# Patient Record
Sex: FEMALE | Race: BLACK OR AFRICAN AMERICAN | NOT HISPANIC OR LATINO | URBAN - METROPOLITAN AREA
[De-identification: names, ages, dates, MRNs, and addresses within clinical notes are randomized per-mention and may not be internally consistent; named-entity substitution may affect disease eponyms.]

---

## 2022-01-01 ENCOUNTER — INPATIENT (INPATIENT)
Facility: HOSPITAL | Age: 37
LOS: 0 days | End: 2022-08-11
Attending: INTERNAL MEDICINE | Admitting: INTERNAL MEDICINE

## 2022-01-01 VITALS — WEIGHT: 179.9 LBS

## 2022-01-01 DIAGNOSIS — E87.2 ACIDOSIS: ICD-10-CM

## 2022-01-01 LAB
ABO RH CONFIRMATION: SIGNIFICANT CHANGE UP
ALBUMIN SERPL ELPH-MCNC: 2.5 G/DL — LOW (ref 3.5–5.2)
ALP SERPL-CCNC: 74 U/L — SIGNIFICANT CHANGE UP (ref 30–115)
ALT FLD-CCNC: 34 U/L — SIGNIFICANT CHANGE UP (ref 0–41)
ANION GAP SERPL CALC-SCNC: 30 MMOL/L — HIGH (ref 7–14)
ANISOCYTOSIS BLD QL: SIGNIFICANT CHANGE UP
APAP SERPL-MCNC: <5 UG/ML — LOW (ref 10–30)
AST SERPL-CCNC: 106 U/L — HIGH (ref 0–41)
BASE EXCESS BLDV CALC-SCNC: -18.8 MMOL/L — LOW (ref -2–3)
BASOPHILS # BLD AUTO: 0.01 K/UL — SIGNIFICANT CHANGE UP (ref 0–0.2)
BASOPHILS NFR BLD AUTO: 1 % — SIGNIFICANT CHANGE UP (ref 0–1)
BILIRUB SERPL-MCNC: 0.4 MG/DL — SIGNIFICANT CHANGE UP (ref 0.2–1.2)
BLD GP AB SCN SERPL QL: SIGNIFICANT CHANGE UP
BUN SERPL-MCNC: 70 MG/DL — CRITICAL HIGH (ref 10–20)
BURR CELLS BLD QL SMEAR: PRESENT — SIGNIFICANT CHANGE UP
CA-I SERPL-SCNC: 1.38 MMOL/L — HIGH (ref 1.15–1.33)
CA-I SERPL-SCNC: 1.96 MMOL/L — CRITICAL HIGH (ref 1.15–1.33)
CALCIUM SERPL-MCNC: 12.6 MG/DL — HIGH (ref 8.5–10.1)
CHLORIDE SERPL-SCNC: 100 MMOL/L — SIGNIFICANT CHANGE UP (ref 98–110)
CO2 BLDV-SCNC: 12.5 MMOL/L — LOW (ref 22–26)
CO2 SERPL-SCNC: 6 MMOL/L — CRITICAL LOW (ref 17–32)
CREAT SERPL-MCNC: 4.5 MG/DL — CRITICAL HIGH (ref 0.7–1.5)
EGFR: 12 ML/MIN/1.73M2 — LOW
EOSINOPHIL # BLD AUTO: 0.01 K/UL — SIGNIFICANT CHANGE UP (ref 0–0.7)
EOSINOPHIL NFR BLD AUTO: 1 % — SIGNIFICANT CHANGE UP (ref 0–8)
ETHANOL SERPL-MCNC: <10 MG/DL — SIGNIFICANT CHANGE UP
GAS PNL BLDV: 128 MMOL/L — LOW (ref 136–145)
GAS PNL BLDV: 133 MMOL/L — LOW (ref 136–145)
GAS PNL BLDV: SIGNIFICANT CHANGE UP
GIANT PLATELETS BLD QL SMEAR: PRESENT — SIGNIFICANT CHANGE UP
GLUCOSE BLDC GLUCOMTR-MCNC: 131 MG/DL — HIGH (ref 70–99)
GLUCOSE SERPL-MCNC: 189 MG/DL — HIGH (ref 70–99)
GRAM STN FLD: SIGNIFICANT CHANGE UP
GRAM STN FLD: SIGNIFICANT CHANGE UP
HCG SERPL QL: NEGATIVE — SIGNIFICANT CHANGE UP
HCO3 BLDV-SCNC: 11 MMOL/L — LOW (ref 22–29)
HCT VFR BLD CALC: 23.5 % — LOW (ref 37–47)
HCT VFR BLDA CALC: 16 % — CRITICAL LOW (ref 39–51)
HCT VFR BLDA CALC: 26 % — LOW (ref 39–51)
HGB BLD CALC-MCNC: 5.2 G/DL — CRITICAL LOW (ref 12.6–17.4)
HGB BLD CALC-MCNC: 8.5 G/DL — LOW (ref 12.6–17.4)
HGB BLD-MCNC: 7 G/DL — LOW (ref 12–16)
HOROWITZ INDEX BLDV+IHG-RTO: 100 — SIGNIFICANT CHANGE UP
LACTATE BLDV-MCNC: 11.3 MMOL/L — CRITICAL HIGH (ref 0.5–2)
LACTATE BLDV-MCNC: 15 MMOL/L — CRITICAL HIGH (ref 0.5–2)
LYMPHOCYTES # BLD AUTO: 0.86 K/UL — LOW (ref 1.2–3.4)
LYMPHOCYTES # BLD AUTO: 96.1 % — HIGH (ref 20.5–51.1)
MACROCYTES BLD QL: SLIGHT — SIGNIFICANT CHANGE UP
MAGNESIUM SERPL-MCNC: 2.6 MG/DL — HIGH (ref 1.8–2.4)
MANUAL SMEAR VERIFICATION: SIGNIFICANT CHANGE UP
MCHC RBC-ENTMCNC: 29.8 G/DL — LOW (ref 32–37)
MCHC RBC-ENTMCNC: 31.4 PG — HIGH (ref 27–31)
MCV RBC AUTO: 105.4 FL — HIGH (ref 81–99)
MONOCYTES # BLD AUTO: 0.02 K/UL — LOW (ref 0.1–0.6)
MONOCYTES NFR BLD AUTO: 1.9 % — SIGNIFICANT CHANGE UP (ref 1.7–9.3)
NEUTROPHILS # BLD AUTO: 0 K/UL — LOW (ref 1.4–6.5)
NEUTROPHILS NFR BLD AUTO: 0 % — LOW (ref 42.2–75.2)
NRBC # BLD: 16 /100 — HIGH (ref 0–0)
NRBC # BLD: SIGNIFICANT CHANGE UP /100 WBCS (ref 0–0)
NT-PROBNP SERPL-SCNC: HIGH PG/ML (ref 0–300)
OVALOCYTES BLD QL SMEAR: SIGNIFICANT CHANGE UP
PCO2 BLDV: 48 MMHG — HIGH (ref 39–42)
PCO2 BLDV: 66 MMHG — HIGH (ref 39–42)
PH BLDV: 6.8 — LOW (ref 7.32–7.43)
PH BLDV: 6.97 — LOW (ref 7.32–7.43)
PLAT MORPH BLD: ABNORMAL
PLATELET # BLD AUTO: 61 K/UL — LOW (ref 130–400)
PO2 BLDV: 50 MMHG — SIGNIFICANT CHANGE UP
PO2 BLDV: 6 MMHG — SIGNIFICANT CHANGE UP
POIKILOCYTOSIS BLD QL AUTO: SIGNIFICANT CHANGE UP
POLYCHROMASIA BLD QL SMEAR: SLIGHT — SIGNIFICANT CHANGE UP
POTASSIUM BLDV-SCNC: 5.8 MMOL/L — HIGH (ref 3.5–5.1)
POTASSIUM BLDV-SCNC: 6.3 MMOL/L — CRITICAL HIGH (ref 3.5–5.1)
POTASSIUM SERPL-MCNC: 6.1 MMOL/L — CRITICAL HIGH (ref 3.5–5)
POTASSIUM SERPL-SCNC: 6.1 MMOL/L — CRITICAL HIGH (ref 3.5–5)
PROT SERPL-MCNC: 4.8 G/DL — LOW (ref 6–8)
RAPID RVP RESULT: DETECTED
RBC # BLD: 2.23 M/UL — LOW (ref 4.2–5.4)
RBC # FLD: 22.9 % — HIGH (ref 11.5–14.5)
RBC BLD AUTO: ABNORMAL
SALICYLATES SERPL-MCNC: 0.8 MG/DL — LOW (ref 4–30)
SAO2 % BLDV: 34.4 % — SIGNIFICANT CHANGE UP
SAO2 % BLDV: 49.8 % — SIGNIFICANT CHANGE UP
SARS-COV-2 RNA SPEC QL NAA+PROBE: DETECTED
SMUDGE CELLS # BLD: PRESENT — SIGNIFICANT CHANGE UP
SODIUM SERPL-SCNC: 136 MMOL/L — SIGNIFICANT CHANGE UP (ref 135–146)
SPECIMEN SOURCE: SIGNIFICANT CHANGE UP
SPECIMEN SOURCE: SIGNIFICANT CHANGE UP
TROPONIN T SERPL-MCNC: 0.03 NG/ML — CRITICAL HIGH
WBC # BLD: 0.9 K/UL — CRITICAL LOW (ref 4.8–10.8)
WBC # FLD AUTO: 0.9 K/UL — CRITICAL LOW (ref 4.8–10.8)

## 2022-01-01 PROCEDURE — 71045 X-RAY EXAM CHEST 1 VIEW: CPT | Mod: 26

## 2022-01-01 PROCEDURE — 93010 ELECTROCARDIOGRAM REPORT: CPT

## 2022-01-01 PROCEDURE — 71275 CT ANGIOGRAPHY CHEST: CPT | Mod: 26,MA

## 2022-01-01 PROCEDURE — 99053 MED SERV 10PM-8AM 24 HR FAC: CPT

## 2022-01-01 PROCEDURE — 72125 CT NECK SPINE W/O DYE: CPT | Mod: 26,MA

## 2022-01-01 PROCEDURE — 99291 CRITICAL CARE FIRST HOUR: CPT | Mod: 25

## 2022-01-01 PROCEDURE — 92950 HEART/LUNG RESUSCITATION CPR: CPT

## 2022-01-01 PROCEDURE — 31500 INSERT EMERGENCY AIRWAY: CPT

## 2022-01-01 PROCEDURE — 74177 CT ABD & PELVIS W/CONTRAST: CPT | Mod: 26,MA

## 2022-01-01 PROCEDURE — 36556 INSERT NON-TUNNEL CV CATH: CPT

## 2022-01-01 PROCEDURE — 70450 CT HEAD/BRAIN W/O DYE: CPT | Mod: 26,MA

## 2022-01-01 PROCEDURE — 76937 US GUIDE VASCULAR ACCESS: CPT | Mod: 26

## 2022-01-01 RX ORDER — FENTANYL CITRATE 50 UG/ML
0.5 INJECTION INTRAVENOUS
Qty: 5000 | Refills: 0 | Status: DISCONTINUED | OUTPATIENT
Start: 2022-01-01 | End: 2022-01-01

## 2022-01-01 RX ORDER — NOREPINEPHRINE BITARTRATE/D5W 8 MG/250ML
0.05 PLASTIC BAG, INJECTION (ML) INTRAVENOUS
Qty: 8 | Refills: 0 | Status: DISCONTINUED | OUTPATIENT
Start: 2022-01-01 | End: 2022-01-01

## 2022-01-01 RX ORDER — FENTANYL CITRATE 50 UG/ML
0.5 INJECTION INTRAVENOUS
Qty: 2500 | Refills: 0 | Status: DISCONTINUED | OUTPATIENT
Start: 2022-01-01 | End: 2022-01-01

## 2022-01-01 RX ORDER — VANCOMYCIN HCL 1 G
1000 VIAL (EA) INTRAVENOUS ONCE
Refills: 0 | Status: COMPLETED | OUTPATIENT
Start: 2022-01-01 | End: 2022-01-01

## 2022-01-01 RX ORDER — CEFEPIME 1 G/1
2000 INJECTION, POWDER, FOR SOLUTION INTRAMUSCULAR; INTRAVENOUS ONCE
Refills: 0 | Status: COMPLETED | OUTPATIENT
Start: 2022-01-01 | End: 2022-01-01

## 2022-01-01 RX ORDER — ROCURONIUM BROMIDE 10 MG/ML
100 VIAL (ML) INTRAVENOUS ONCE
Refills: 0 | Status: COMPLETED | OUTPATIENT
Start: 2022-01-01 | End: 2022-01-01

## 2022-01-01 RX ORDER — ETOMIDATE 2 MG/ML
20 INJECTION INTRAVENOUS ONCE
Refills: 0 | Status: COMPLETED | OUTPATIENT
Start: 2022-01-01 | End: 2022-01-01

## 2022-01-01 RX ORDER — CHLORHEXIDINE GLUCONATE 213 G/1000ML
1 SOLUTION TOPICAL
Refills: 0 | Status: DISCONTINUED | OUTPATIENT
Start: 2022-01-01 | End: 2022-01-01

## 2022-01-01 RX ORDER — NOREPINEPHRINE BITARTRATE/D5W 8 MG/250ML
0.05 PLASTIC BAG, INJECTION (ML) INTRAVENOUS
Qty: 32 | Refills: 0 | Status: DISCONTINUED | OUTPATIENT
Start: 2022-01-01 | End: 2022-01-01

## 2022-01-01 RX ORDER — SODIUM CHLORIDE 9 MG/ML
1000 INJECTION, SOLUTION INTRAVENOUS ONCE
Refills: 0 | Status: COMPLETED | OUTPATIENT
Start: 2022-01-01 | End: 2022-01-01

## 2022-01-01 RX ORDER — VASOPRESSIN 20 [USP'U]/ML
0.02 INJECTION INTRAVENOUS
Qty: 50 | Refills: 0 | Status: DISCONTINUED | OUTPATIENT
Start: 2022-01-01 | End: 2022-01-01

## 2022-01-01 RX ORDER — SODIUM CHLORIDE 9 MG/ML
1000 INJECTION INTRAMUSCULAR; INTRAVENOUS; SUBCUTANEOUS ONCE
Refills: 0 | Status: COMPLETED | OUTPATIENT
Start: 2022-01-01 | End: 2022-01-01

## 2022-01-01 RX ADMIN — SODIUM CHLORIDE 1000 MILLILITER(S): 9 INJECTION INTRAMUSCULAR; INTRAVENOUS; SUBCUTANEOUS at 02:17

## 2022-01-01 RX ADMIN — Medication 5.91 MICROGRAM(S)/KG/MIN: at 23:55

## 2022-01-01 RX ADMIN — Medication 100 MILLIGRAM(S): at 23:15

## 2022-01-01 RX ADMIN — FENTANYL CITRATE 3.15 MICROGRAM(S)/KG/HR: 50 INJECTION INTRAVENOUS at 02:17

## 2022-01-01 RX ADMIN — VASOPRESSIN 1.2 UNIT(S)/MIN: 20 INJECTION INTRAVENOUS at 02:17

## 2022-01-01 RX ADMIN — ETOMIDATE 20 MILLIGRAM(S): 2 INJECTION INTRAVENOUS at 23:15

## 2022-01-01 RX ADMIN — Medication 2.95 MICROGRAM(S)/KG/MIN: at 01:35

## 2022-01-01 RX ADMIN — SODIUM CHLORIDE 1000 MILLILITER(S): 9 INJECTION, SOLUTION INTRAVENOUS at 01:19

## 2022-01-01 RX ADMIN — CEFEPIME 100 MILLIGRAM(S): 1 INJECTION, POWDER, FOR SOLUTION INTRAMUSCULAR; INTRAVENOUS at 02:16

## 2022-01-01 RX ADMIN — Medication 250 MILLIGRAM(S): at 02:18

## 2022-08-10 NOTE — ED ADULT TRIAGE NOTE - CHIEF COMPLAINT QUOTE
prenotification for respiratory distress  upon arrival to ED, pt in cardiac arrest, chest compressions in progress

## 2022-08-11 NOTE — ED PROVIDER NOTE - CLINICAL SUMMARY MEDICAL DECISION MAKING FREE TEXT BOX
37-year-old female presented to ED in cardiac arrest.  Multiple rounds of epi and intubation bicarb and calcium ROSC was achieved.  Patient was found to be hypotensive and was started on Levophed drip.  Patient had central line placed and A-line placed.  Patient had labs which demonstrated lactate of 15, anemia with hgb of 7, creatinine of 4.5 Nuñez was placed without any urine, pH of 6.8 due to cardiac arrest. CT demonstrated multifocal pneumonia patient started on broad spectrum antibiotics.  Patient's white blood cell count 0.9.  Troponin elevated to 0.03 due to demand.  Patient's COVID test positive.  CT head demonstrated anoxic brain injury.  Discussed results with family and patient admitted to ICU for further evaluation and management.

## 2022-08-11 NOTE — ED PROVIDER NOTE - SECONDARY DIAGNOSIS.
Lung transplanted Sepsis with acute respiratory failure and septic shock 2019 novel coronavirus disease (COVID-19) Acute renal failure Brain anoxic injury

## 2022-08-11 NOTE — ED PROVIDER NOTE - ATTENDING CONTRIBUTION TO CARE
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  Patient arrived in the emergency department she was being bagged and active compressions.  According to mom patient had COVID 2 weeks ago received antibody treatment but over the past couple days she has been increasingly weak more short of breath as well as having diarrhea.  Tonight she told her mom that she did not feel well but she wanted to wait to the morning before she saw her doctor.    Const: active CPR in progress   Eyes: pupils non-reactive   GI: vomit coming out   MSK: No gross deformities appreciated    ACLS

## 2022-08-11 NOTE — DISCHARGE NOTE FOR THE EXPIRED PATIENT - HOSPITAL COURSE
37 F with PMH of ILD s/p L lung transplant BIBEMS; was hypoxic at home to 80s on NRB, had cardiac arrest en route to the hospital. She had tested positive for COVID recently and was increasingly short of breath leading up to today. CPR started by EMS and continued in ED for a total of 20 minutes, intubated. She later had cardiac arrest in ED, ROSC achieved after 10 minutes (asystole/PEA, epinephrine and bicarb given), cardiac arrest again, ROSC after 6 minutes (PEA/asystole). After this third cardiac arrest, the family decided to make her DNR. She was pronounced dead at 3:27 am on 8/11/2022.    37 F with PMH of ILD s/p L lung transplant BIBEMS; was hypoxic at home to 80s on NRB, had cardiac arrest en route to the hospital. She had tested positive for COVID recently and was increasingly short of breath leading up to today. CPR started by EMS and continued in ED for a total of 20 minutes, intubated. She later had cardiac arrest in ED, ROSC achieved after 10 minutes (asystole/PEA, epinephrine and bicarb given), cardiac arrest again, ROSC after 6 minutes (PEA/asystole). After third cardiac arrest, the family decided to make her DNR. She went into asystole at 3:27 am on 8/11/2022, pronounced dead.

## 2022-08-11 NOTE — ED PROCEDURE NOTE - CPROC ED TIME OUT STATEMENT1
“Patient's name, , procedure and correct site were confirmed during the [facility] Timeout.”
“Patient's name, , procedure and correct site were confirmed during the [facility] Timeout.”
“Patient's name, , procedure and correct site were confirmed during the [facility] Timeout.”

## 2022-08-11 NOTE — ED PROCEDURE NOTE - NS ED ATTENDING STATEMENT MOD
Attending with
This was a shared visit with the EDSON. I reviewed and verified the documentation and independently performed the documented:
Attending with
Attending with

## 2022-08-11 NOTE — ED PROCEDURE NOTE - CPROC ED ARTER LINE DETAIL1
Using sterile technique, the correct location was identified, and a needle was inserted into the artery (specify in FT)./Positive blood return was obtained via the catheter./Connected to a pressurized flush line./Line was sutured in place./Ultrasound guidance was used.

## 2022-08-11 NOTE — ED ADULT NURSE NOTE - SEPSIS REFERENCE DATA CRITERIA 1
Abormal VS: Temp > 100F or < 96.8F; SBP < 90 mmHG; HR > 120bpm; Resp > 24/min
Mervin Gibson)  Orthopaedic Surgery  [address] [facility] [address]  [place] [address]  Phone: [phone]  Fax: [phone]  Follow Up Time:

## 2022-08-11 NOTE — ED PROCEDURE NOTE - PROCEDURE ADDITIONAL DETAILS
Guidewire visualized entering femoral vein in transverse and saggittal planes prior to vessel dilation.

## 2022-08-11 NOTE — ED PROVIDER NOTE - OBJECTIVE STATEMENT
37-year-old female past medical history of interstitial lung disease with left lung transplant May 2022, tamponade status post pericardial window July 2022 presents to ED as a prenotification for respiratory distress with hypoxia 84 O2 sat on NRB at 15L. Patient arrived in the emergency department unresponsive, she was being bagged and active compressions. Per EMS, en route pt became pulseless and started compressions for 2 min PTA. According to mom, patient had COVID 2 weeks ago, received monoclonal antibody treatment x 2 days ago, but over the past couple days she has been increasingly weak, worsening shortness of breath as well as having diarrhea. No other info available at this time.

## 2022-08-11 NOTE — CHART NOTE - NSCHARTNOTEFT_GEN_A_CORE
IMPRESSION:  Acute hypoxemic respiratory failure  CPA x20min, s/p ROSC  Septic shock  Multilobar pneumonia  Recent COVID infection 2w ago without inprovement, s/p monoclonal antibodies  Lactic acidosis  Anoxic brain injury with cererbal edema  Pancytopenia  Acute renal failure  HO ILD s/p left lung transplant and non-functional right lung    PLAN:    CNS: Adequate sedation, fentanyl and versed as needed. Targeted temperature management.     HEENT: Oral care    PULMONARY:  HOB @ 45 degrees. Vent changes as follows: ARDS network, TV 6cc/kg IBW. Target SpO2 92-96%, titrate oxygen as tolerated. Monitor peak and plateau pressures. Repeat ABG.    CARDIOVASCULAR: ECHO. Avoid volume overload. Strict I's and O's. ECHO. Goal directed fluid resuscitation. Target MAP>60-65. Wean pressors as tolerated. CHEETAH.     GI: GI prophylaxis. OG Feeding.    RENAL: Follow up lytes. Correct as needed. Renal sono. Trend Cr. BMBP q12h. Nuñez. Trend LA q8h.     INFECTIOUS DISEASE: Follow up cultures. Vanc, cefepime, and levofloxacin for now. PanCx. Procal. Fungitell. Nasal MRSA. DTA. UA. Urine strep/legionella. 2 sets of BCx. Vancomycin and merrem for now.     HEMATOLOGICAL:  DVT prophylaxis. LE venous duplex.     ENDOCRINE:  Follow up FS. Target -180.    MUSCULOSKELETAL: bedrest    Place TLC and A-line    Overall extremely poor prognosis    GOC needed IMPRESSION:  Acute hypoxemic respiratory failure  CPA x20min, s/p ROSC  Septic shock  Multilobar pneumonia  Recent COVID infection 2w ago without inprovement, s/p monoclonal antibodies  Lactic acidosis  Anoxic brain injury with cererbal edema  Pancytopenia  Acute renal failure  HO ILD s/p left lung transplant and non-functional right lung    PLAN:    CNS: Adequate sedation, fentanyl and versed as needed. Targeted temperature management.     HEENT: Oral care    PULMONARY:  HOB @ 45 degrees. Vent changes as follows: ARDS network, TV 6cc/kg IBW. Target SpO2 92-96%, titrate oxygen as tolerated. Monitor peak and plateau pressures. Repeat ABG.    CARDIOVASCULAR: ECHO. Avoid volume overload. Strict I's and O's. ECHO. Goal directed fluid resuscitation. Target MAP>60-65. Wean pressors as tolerated. Levophed. Add vasopressin. CHEETAH.     GI: GI prophylaxis. OG Feeding.    RENAL: Follow up lytes. Correct as needed. Renal sono. Trend Cr. BMBP q12h. Nuñez. Trend LA q8h.     INFECTIOUS DISEASE: Vanc, cefepime, and levofloxacin for now. PanCx. Procal. Fungitell. Nasal MRSA. DTA. UA. Urine strep/legionella. 2 sets of BCx.     HEMATOLOGICAL:  DVT prophylaxis. LE venous duplex.     ENDOCRINE:  Follow up FS. Target -180.    MUSCULOSKELETAL: bedrest    Place TLC and A-line    Overall extremely poor prognosis    GOC needed IMPRESSION:  Acute hypoxemic respiratory failure  CPA x20min, s/p ROSC  Septic shock  Multilobar pneumonia  Recent COVID infection 2w ago without inprovement, s/p monoclonal antibodies  Lactic acidosis  Anoxic brain injury with cererbal edema  Pancytopenia  Acute renal failure  HO ILD s/p left lung transplant and non-functional right lung    PLAN:    CNS: Adequate sedation, fentanyl and versed as needed. Targeted temperature management.     HEENT: Oral care    PULMONARY:  HOB @ 45 degrees. Vent changes as follows: ARDS network, TV 6cc/kg IBW. Target SpO2 92-96%, titrate oxygen as tolerated. Monitor peak and plateau pressures. Repeat ABG.    CARDIOVASCULAR: ECHO. Avoid volume overload. Strict I's and O's. ECHO. Goal directed fluid resuscitation. Target MAP>60-65. Wean pressors as tolerated. Levophed. Add vasopressin. CHEETAH.     GI: GI prophylaxis. OG Feeding.    RENAL: Follow up lytes. Correct as needed. Renal sono. Trend Cr. BMBP q12h. Nuñez. Trend LA q8h.     INFECTIOUS DISEASE: Vanc, cefepime, and levofloxacin for now. PanCx. Procal. Fungitell. Nasal MRSA. DTA. UA. Urine strep/legionella. 2 sets of BCx.     HEMATOLOGICAL:  DVT prophylaxis. LE venous duplex.     ENDOCRINE:  Follow up FS. Target -180.    MUSCULOSKELETAL: bedrest    Obtain hospital records from transplant center    Place TLC and A-line    Overall extremely poor prognosis    GOC needed IMPRESSION:  Acute hypoxemic respiratory failure  CPA x20min, s/p ROSC  Septic shock  Multilobar pneumonia  Recent COVID infection 2w ago without improvement, s/p monoclonal antibodies  Lactic acidosis  Anoxic brain injury with cerebral edema  Pancytopenia  Acute renal failure  HO ILD s/p left lung transplant and non-functional right lung    PLAN:    CNS: Adequate sedation, fentanyl and versed as needed. Targeted temperature management.     HEENT: Oral care    PULMONARY:  HOB @ 45 degrees. Vent changes as follows: ARDS network, TV 6cc/kg IBW. Target SpO2 92-96%, titrate oxygen as tolerated. Monitor peak and plateau pressures. Repeat ABG.    CARDIOVASCULAR: ECHO. Avoid volume overload. Strict I's and O's. ECHO. Goal directed fluid resuscitation. Target MAP>60-65. Wean pressors as tolerated. Levophed. Add vasopressin. CHEETAH. Daily EKG.     GI: GI prophylaxis. OG Feeding, low K diet.     RENAL: Follow up lytes. Correct as needed. Renal sono. Trend Cr. BMP q12h. Nuñez. Trend LA q8h.     INFECTIOUS DISEASE: Vanc, cefepime, and levofloxacin for now. PanCx. Procal. Fungitell. Nasal MRSA. DTA. UA. Urine strep/legionella. 2 sets of BCx.     HEMATOLOGICAL:  DVT prophylaxis. LE venous duplex.     ENDOCRINE:  Follow up FS. Target -180.    MUSCULOSKELETAL: bedrest    Obtain hospital records from transplant center    Place TLC and A-line    Overall extremely poor prognosis    GOC needed IMPRESSION:  Acute hypoxemic respiratory failure  CPA x20min, s/p ROSC  Septic shock  Multilobar pneumonia  Recent COVID infection 2w ago without improvement, s/p monoclonal antibodies  Lactic acidosis  Anoxic brain injury with cerebral edema  Pancytopenia  Acute renal failure  HO ILD s/p left lung transplant and non-functional right lung    PLAN:    CNS: Adequate sedation, fentanyl and versed as needed. Targeted temperature management.     HEENT: Oral care    PULMONARY:  HOB @ 45 degrees. Vent changes as follows: ARDS network, TV 6cc/kg IBW. Target SpO2 92-96%, titrate oxygen as tolerated. Monitor peak and plateau pressures. Repeat ABG.    CARDIOVASCULAR: ECHO. Avoid volume overload. Strict I's and O's. ECHO. Goal directed fluid resuscitation. Target MAP>60-65. Wean pressors as tolerated. Levophed. Add vasopressin. CHEETAH. Daily EKG. D5W with 3 amps HCO3 at 100cc/hr.     GI: GI prophylaxis. OG Feeding, low K diet.     RENAL: Follow up lytes. Correct as needed. Renal sono. Trend Cr. BMP/VBG q6h for now. Nuñez. Trend LA q8h.     INFECTIOUS DISEASE: Vanc, cefepime, and levofloxacin for now. PanCx. Procal. Fungitell. Nasal MRSA. DTA. UA. Urine strep/legionella. 2 sets of BCx.     HEMATOLOGICAL:  DVT prophylaxis. LE venous duplex.     ENDOCRINE:  Follow up FS. Target -180.  q8h.    MUSCULOSKELETAL: bedrest    Obtain hospital records from transplant center    Place TLC and A-line    Overall extremely poor prognosis    GOC needed

## 2022-08-11 NOTE — ED ADULT NURSE REASSESSMENT NOTE - NS ED NURSE REASSESS COMMENT FT1
pt lost pulse 0327. MD, RN & family at bedside. pt made DNR. no intervention performed at this time as per family wishes. time of death called by MD at 0327.

## 2022-08-11 NOTE — ED PROCEDURE NOTE - PROCEDURE NAME, MLM
Tracheal Intubation
Gastric Intubation/Gastric Lavage
Central Line Insertion
Arterial Puncture/Cannulation

## 2022-08-11 NOTE — ED PROVIDER NOTE - PROGRESS NOTE DETAILS
Authored by Dr. Wild: CPR continued with ACLS for about 20 min on arrival. PEA/asystole, no shockable rhythm. Intubated, multiple rounds of epi, calcium, bicarb given. ROSC achieved. Patient hypotensive, central line and A-line placed.  Discussed with mother results of poor prognosis and CT findings of anoxic brain injury. Admitted to ICU.

## 2022-08-11 NOTE — ED PROVIDER NOTE - PHYSICAL EXAMINATION
VITAL SIGNS: I have reviewed nursing notes and confirm.  CONSTITUTIONAL: + unresponsive, critically ill   SKIN: + L sided chest w multiple well healed scars, no petechiae.  EYES: + pupillary dilation, non-reactive, pink conjunctiva, anicteric  ENT: + intubated  CARD: + pulseless  RESP: + intubated w b/l breath sounds   EXT: No edema.   NEURO: + unresponsive

## 2022-08-11 NOTE — ED PROVIDER NOTE - CARE PLAN
Principal Discharge DX:	Sudden cardiac arrest  Secondary Diagnosis:	Sepsis with acute respiratory failure and septic shock  Secondary Diagnosis:	Lung transplanted  Secondary Diagnosis:	2019 novel coronavirus disease (COVID-19)  Secondary Diagnosis:	Acute renal failure   1 Principal Discharge DX:	Sudden cardiac arrest  Secondary Diagnosis:	Sepsis with acute respiratory failure and septic shock  Secondary Diagnosis:	Lung transplanted  Secondary Diagnosis:	2019 novel coronavirus disease (COVID-19)  Secondary Diagnosis:	Acute renal failure  Secondary Diagnosis:	Brain anoxic injury

## 2022-08-11 NOTE — DISCHARGE NOTE FOR THE EXPIRED PATIENT - SECONDARY DIAGNOSIS.
Lung transplanted Acute renal failure Sepsis with acute respiratory failure and septic shock 2019 novel coronavirus disease (COVID-19) Brain anoxic injury

## 2022-08-11 NOTE — ED PROVIDER NOTE - PRINCIPAL DIAGNOSIS
Informed Dr. Vargas via telephone conversation that patient's B/p is 139/107 and pulse is 129.   Sudden cardiac arrest

## 2022-08-11 NOTE — ED PROCEDURE NOTE - CPROC ED INDICATIONS1
intubation
arterial puncture to obtain ABG's/blood sampling/cannulation purposes/critical patient/monitoring purposes
cardiac arrest/critical patient/respiratory failure
hypertonic/irritant infusion

## 2022-08-11 NOTE — ED PROCEDURE NOTE - CPROC ED TOLERANCE1
Patient tolerated procedure well.
No pertinent family history in first degree relatives

## 2022-08-11 NOTE — ED ADULT NURSE REASSESSMENT NOTE - NS ED NURSE REASSESS COMMENT FT1
pt with witnessed cardiac arrest at 0249 in ED. code blue called immediately and chest compressions started. ROSC achieved at 0308. see CPR flow sheet.

## 2022-08-11 NOTE — ED PROCEDURE NOTE - CPROC ED POST RADIOGRAPHY1
post-procedure radiography performed/gastric tube in stomach/duodenum
post procedure radiography not performed

## 2022-08-12 LAB
E COLI DNA BLD POS QL NAA+NON-PROBE: SIGNIFICANT CHANGE UP
GRAM STN FLD: SIGNIFICANT CHANGE UP
GRAM STN FLD: SIGNIFICANT CHANGE UP
METHOD TYPE: SIGNIFICANT CHANGE UP
P AERUGINOSA DNA BLD POS NAA+NON-PROBE: SIGNIFICANT CHANGE UP

## 2022-08-13 LAB
-  AMIKACIN: SIGNIFICANT CHANGE UP
-  AMIKACIN: SIGNIFICANT CHANGE UP
-  AMPICILLIN/SULBACTAM: SIGNIFICANT CHANGE UP
-  AMPICILLIN: SIGNIFICANT CHANGE UP
-  AZTREONAM: SIGNIFICANT CHANGE UP
-  AZTREONAM: SIGNIFICANT CHANGE UP
-  CEFAZOLIN: SIGNIFICANT CHANGE UP
-  CEFEPIME: SIGNIFICANT CHANGE UP
-  CEFEPIME: SIGNIFICANT CHANGE UP
-  CEFOXITIN: SIGNIFICANT CHANGE UP
-  CEFTAZIDIME: SIGNIFICANT CHANGE UP
-  CEFTRIAXONE: SIGNIFICANT CHANGE UP
-  CIPROFLOXACIN: SIGNIFICANT CHANGE UP
-  CIPROFLOXACIN: SIGNIFICANT CHANGE UP
-  ERTAPENEM: SIGNIFICANT CHANGE UP
-  GENTAMICIN: SIGNIFICANT CHANGE UP
-  GENTAMICIN: SIGNIFICANT CHANGE UP
-  IMIPENEM: SIGNIFICANT CHANGE UP
-  IMIPENEM: SIGNIFICANT CHANGE UP
-  LEVOFLOXACIN: SIGNIFICANT CHANGE UP
-  LEVOFLOXACIN: SIGNIFICANT CHANGE UP
-  MEROPENEM: SIGNIFICANT CHANGE UP
-  MEROPENEM: SIGNIFICANT CHANGE UP
-  PIPERACILLIN/TAZOBACTAM: SIGNIFICANT CHANGE UP
-  PIPERACILLIN/TAZOBACTAM: SIGNIFICANT CHANGE UP
-  TOBRAMYCIN: SIGNIFICANT CHANGE UP
-  TOBRAMYCIN: SIGNIFICANT CHANGE UP
-  TRIMETHOPRIM/SULFAMETHOXAZOLE: SIGNIFICANT CHANGE UP
CULTURE RESULTS: SIGNIFICANT CHANGE UP
CULTURE RESULTS: SIGNIFICANT CHANGE UP
METHOD TYPE: SIGNIFICANT CHANGE UP
METHOD TYPE: SIGNIFICANT CHANGE UP
ORGANISM # SPEC MICROSCOPIC CNT: SIGNIFICANT CHANGE UP
SPECIMEN SOURCE: SIGNIFICANT CHANGE UP
SPECIMEN SOURCE: SIGNIFICANT CHANGE UP

## 2022-08-16 DIAGNOSIS — Z94.2 LUNG TRANSPLANT STATUS: ICD-10-CM

## 2022-08-16 DIAGNOSIS — D61.818 OTHER PANCYTOPENIA: ICD-10-CM

## 2022-08-16 DIAGNOSIS — Z66 DO NOT RESUSCITATE: ICD-10-CM

## 2022-08-16 DIAGNOSIS — U07.1 COVID-19: ICD-10-CM

## 2022-08-16 DIAGNOSIS — D64.9 ANEMIA, UNSPECIFIED: ICD-10-CM

## 2022-08-16 DIAGNOSIS — J12.82 PNEUMONIA DUE TO CORONAVIRUS DISEASE 2019: ICD-10-CM

## 2022-08-16 DIAGNOSIS — G93.6 CEREBRAL EDEMA: ICD-10-CM

## 2022-08-16 DIAGNOSIS — J96.01 ACUTE RESPIRATORY FAILURE WITH HYPOXIA: ICD-10-CM

## 2022-08-16 DIAGNOSIS — N17.9 ACUTE KIDNEY FAILURE, UNSPECIFIED: ICD-10-CM

## 2022-08-16 DIAGNOSIS — E87.2 ACIDOSIS: ICD-10-CM

## 2022-08-16 DIAGNOSIS — R65.21 SEVERE SEPSIS WITH SEPTIC SHOCK: ICD-10-CM

## 2022-08-16 DIAGNOSIS — I46.8 CARDIAC ARREST DUE TO OTHER UNDERLYING CONDITION: ICD-10-CM

## 2022-08-16 DIAGNOSIS — J84.9 INTERSTITIAL PULMONARY DISEASE, UNSPECIFIED: ICD-10-CM

## 2022-08-16 DIAGNOSIS — A41.89 OTHER SPECIFIED SEPSIS: ICD-10-CM

## 2022-08-16 DIAGNOSIS — G93.1 ANOXIC BRAIN DAMAGE, NOT ELSEWHERE CLASSIFIED: ICD-10-CM

## 2022-08-16 DIAGNOSIS — I46.9 CARDIAC ARREST, CAUSE UNSPECIFIED: ICD-10-CM

## 2022-08-16 LAB — GLUCOSE BLDC GLUCOMTR-MCNC: 80 MG/DL — SIGNIFICANT CHANGE UP (ref 70–99)
